# Patient Record
Sex: FEMALE | Race: WHITE | NOT HISPANIC OR LATINO | Employment: OTHER | URBAN - METROPOLITAN AREA
[De-identification: names, ages, dates, MRNs, and addresses within clinical notes are randomized per-mention and may not be internally consistent; named-entity substitution may affect disease eponyms.]

---

## 2023-07-06 ENCOUNTER — OFFICE VISIT (OUTPATIENT)
Dept: URGENT CARE | Facility: CLINIC | Age: 68
End: 2023-07-06
Payer: COMMERCIAL

## 2023-07-06 ENCOUNTER — APPOINTMENT (OUTPATIENT)
Dept: RADIOLOGY | Facility: CLINIC | Age: 68
End: 2023-07-06
Payer: COMMERCIAL

## 2023-07-06 VITALS
DIASTOLIC BLOOD PRESSURE: 90 MMHG | HEART RATE: 84 BPM | SYSTOLIC BLOOD PRESSURE: 148 MMHG | OXYGEN SATURATION: 99 % | RESPIRATION RATE: 20 BRPM | WEIGHT: 122 LBS

## 2023-07-06 DIAGNOSIS — S67.21XA CRUSHING INJURY OF RIGHT HAND, INITIAL ENCOUNTER: ICD-10-CM

## 2023-07-06 DIAGNOSIS — S67.21XA CRUSHING INJURY OF RIGHT HAND, INITIAL ENCOUNTER: Primary | ICD-10-CM

## 2023-07-06 PROCEDURE — 99203 OFFICE O/P NEW LOW 30 MIN: CPT | Performed by: PHYSICIAN ASSISTANT

## 2023-07-06 PROCEDURE — 73130 X-RAY EXAM OF HAND: CPT

## 2023-07-06 PROCEDURE — S9083 URGENT CARE CENTER GLOBAL: HCPCS | Performed by: PHYSICIAN ASSISTANT

## 2023-07-06 RX ORDER — MULTIVIT-MIN/IRON FUM/FOLIC AC 7.5 MG-4
1 TABLET ORAL DAILY
COMMUNITY

## 2023-07-06 RX ORDER — LEVOTHYROXINE SODIUM 0.03 MG/1
25 TABLET ORAL DAILY
COMMUNITY
Start: 2023-05-09

## 2023-07-06 RX ORDER — EZETIMIBE 10 MG/1
10 TABLET ORAL DAILY
COMMUNITY
Start: 2023-05-09

## 2023-07-06 RX ORDER — MAGNESIUM 30 MG
30 TABLET ORAL 2 TIMES DAILY
COMMUNITY

## 2023-07-06 NOTE — PATIENT INSTRUCTIONS
1. Apply ice compresses to the injured area 3-4 times daily for 20-30 minutes for the next 3-4 days then convert to heat in the same regimen until symptoms resolve. 2. Perform range-of-motion/stretches as demonstrated 4 times daily, 5-6 reps. 3. Go to the ER if your symptoms significantly worsen. 4.  Attend to the finger abrasions as demonstrated by nursing. 5. Follow-up with orthopedics in 7-10 days for any persistent symptoms and back here immediately for any signs of infection.

## 2023-07-06 NOTE — PROGRESS NOTES
[facility]        NAME: Vladislav Velazco is a 70 y.o. female  : 1955    MRN: [id number]  DATE: 2023  TIME: 9:38 AM    Assessment and Plan   Crushing injury of right hand, initial encounter [S67.21XA]  1. Crushing injury of right hand, initial encounter  XR hand 3+ vw right            Patient Instructions     1. Apply ice compresses to the injured area 3-4 times daily for 20-30 minutes for the next 3-4 days then convert to heat in the same regimen until symptoms resolve. 2. Perform range-of-motion/stretches as demonstrated 4 times daily, 5-6 reps. 3. Go to the ER if your symptoms significantly worsen. 4.  Attend to the finger abrasions as demonstrated by nursing. 5. Follow-up with orthopedics in 7-10 days for any persistent symptoms and back here immediately for any signs of infection. Chief Complaint     Chief Complaint   Patient presents with   • Pain     Right hand pain s/p garage door mishap which door her hand got caught. Pain is 9/10 with movement. Limited ROM. Lacerations to pointer and middle fingers. Not taking anything for pain now. Took ibuprofen 600mg the first day which helped with swelling         History of Present Illness       60-year-old female patient with a 5-day history of persistent right hand pain after getting the hand stuck in a garage door as she was closing. Patient indicates the focus of the pain is in the proximal aspect of the dorsum of the third finger but has more minor pain in the distal aspect of the hand and the adjacent fingers. Subjectively, patient is unable to fully make a fist due to increasing pain. She also notes some soft tissue injuries to the distal aspects of the second and third finger. Tetanus was updated within the last 10 years. Review of Systems   Review of Systems   Constitutional: Negative for chills and fever. HENT: Negative for ear pain and sore throat. Eyes: Negative for pain and visual disturbance.    Respiratory: Negative for cough and shortness of breath. Cardiovascular: Negative for chest pain and palpitations. Gastrointestinal: Negative for abdominal pain and vomiting. Genitourinary: Negative for dysuria and hematuria. Musculoskeletal: Positive for arthralgias. Negative for back pain. Skin: Positive for wound. Negative for color change and rash. Neurological: Negative for seizures and syncope. All other systems reviewed and are negative. Current Medications       Current Outpatient Medications:   •  magnesium 30 MG tablet, Take 30 mg by mouth 2 (two) times a day, Disp: , Rfl:   •  Multiple Vitamins-Minerals (multivitamin with minerals) tablet, Take 1 tablet by mouth daily, Disp: , Rfl:   •  ezetimibe (ZETIA) 10 mg tablet, Take 10 mg by mouth daily, Disp: , Rfl:   •  levothyroxine 25 mcg tablet, Take 25 mcg by mouth daily, Disp: , Rfl:     Current Allergies     Allergies as of 07/06/2023 - Reviewed 07/06/2023   Allergen Reaction Noted   • Morphine Vomiting 07/06/2023   • Statins Other (See Comments) 07/06/2023            The following portions of the patient's history were reviewed and updated as appropriate: allergies, current medications, past family history, past medical history, past social history, past surgical history and problem list.     Past Medical History:   Diagnosis Date   • Hypercholesterolemia    • Hypothyroidism        Past Surgical History:   Procedure Laterality Date   • BASAL CELL CARCINOMA EXCISION  01/12/2023    bottom lip   • SKIN CANCER EXCISION         History reviewed. No pertinent family history. Medications have been verified. Objective   /90   Pulse 84   Resp 20   Wt 55.3 kg (122 lb)   SpO2 99%        Physical Exam     Physical Exam  Constitutional:       Appearance: Normal appearance. HENT:      Head: Normocephalic.       Nose: Nose normal.   Eyes:      Conjunctiva/sclera: Conjunctivae normal.      Pupils: Pupils are equal, round, and reactive to light. Cardiovascular:      Rate and Rhythm: Normal rate. Pulmonary:      Effort: Pulmonary effort is normal.   Musculoskeletal:      Cervical back: Normal range of motion. Comments: Right hand is tender, swelling, ecchymotic over the proximal aspect of the third finger and the distal hand over the second through fourth MCPs. Range of motion is grossly intact with decreased strength patient believes due to pain. No distal sensory complaints. Skin:     Capillary Refill: Capillary refill takes less than 2 seconds. Comments: Superficial abrasions noted over the dorsal aspects of the DIP joints of the second and third finger on the right. No stigmata of infection. Neurological:      Mental Status: She is alert and oriented to person, place, and time. Psychiatric:         Mood and Affect: Mood normal.         Behavior: Behavior normal.           Preliminary interpretation of right hand x-ray:  Generalized DJD but no acute osseous abnormality seen.